# Patient Record
Sex: FEMALE | Race: WHITE | NOT HISPANIC OR LATINO | URBAN - METROPOLITAN AREA
[De-identification: names, ages, dates, MRNs, and addresses within clinical notes are randomized per-mention and may not be internally consistent; named-entity substitution may affect disease eponyms.]

---

## 2019-12-19 ENCOUNTER — TELEPHONE (OUTPATIENT)
Dept: OBGYN | Facility: CLINIC | Age: 44
End: 2019-12-19

## 2019-12-19 NOTE — TELEPHONE ENCOUNTER
CHARTING FROM 12/16/19    Received call from [facility] that patient has missed AB and wants tubal ligation at the same time as D&C. Doctor at [facility] was not comfortable doing the d&c and tubal on a gravid uterus.     Discussed with Dr. Aguayo in clinic. We would not do a tubal out of convenience. Patient would need a consult here and schedule surgery.    Nurse spoke with Bita at [facility] and explained that they can do the D&C and she can come here for a tubal consult later after D&C if she wants. They are agreeable and will let the patient know to call and schedule consult.